# Patient Record
Sex: FEMALE | Race: WHITE | NOT HISPANIC OR LATINO | URBAN - METROPOLITAN AREA
[De-identification: names, ages, dates, MRNs, and addresses within clinical notes are randomized per-mention and may not be internally consistent; named-entity substitution may affect disease eponyms.]

---

## 2021-12-02 ENCOUNTER — OFFICE VISIT (OUTPATIENT)
Dept: URBAN - METROPOLITAN AREA CLINIC 60 | Facility: CLINIC | Age: 66
End: 2021-12-02

## 2021-12-16 ENCOUNTER — OFFICE VISIT (OUTPATIENT)
Dept: URBAN - METROPOLITAN AREA CLINIC 60 | Facility: CLINIC | Age: 66
End: 2021-12-16

## 2022-01-27 ENCOUNTER — OFFICE VISIT (OUTPATIENT)
Dept: URBAN - METROPOLITAN AREA SURGERY CENTER 4 | Facility: SURGERY CENTER | Age: 67
End: 2022-01-27

## 2022-02-10 ENCOUNTER — OFFICE VISIT (OUTPATIENT)
Dept: URBAN - METROPOLITAN AREA CLINIC 60 | Facility: CLINIC | Age: 67
End: 2022-02-10

## 2022-02-24 ENCOUNTER — OFFICE VISIT (OUTPATIENT)
Dept: URBAN - METROPOLITAN AREA SURGERY CENTER 4 | Facility: SURGERY CENTER | Age: 67
End: 2022-02-24

## 2022-02-28 LAB — PATHOLOGY (INDENTED REPORT): (no result)

## 2022-03-10 ENCOUNTER — OFFICE VISIT (OUTPATIENT)
Dept: URBAN - METROPOLITAN AREA CLINIC 60 | Facility: CLINIC | Age: 67
End: 2022-03-10

## 2022-07-09 ENCOUNTER — TELEPHONE ENCOUNTER (OUTPATIENT)
Dept: URBAN - METROPOLITAN AREA CLINIC 121 | Facility: CLINIC | Age: 67
End: 2022-07-09

## 2022-07-09 RX ORDER — LECITHIN 1200 MG
CAPSULE ORAL
Refills: 0 | OUTPATIENT
Start: 2009-04-21 | End: 2016-09-21

## 2022-07-09 RX ORDER — LEVALBUTEROL TARTRATE 45 UG/1
AEROSOL, METERED ORAL
Refills: 0 | OUTPATIENT
Start: 2021-10-21 | End: 2021-12-16

## 2022-07-09 RX ORDER — APIXABAN 5 MG/1
TABLET, FILM COATED ORAL ONCE A DAY
Refills: 0 | OUTPATIENT
Start: 2021-10-06 | End: 2021-12-16

## 2022-07-09 RX ORDER — LEVOCETIRIZINE DIHYDROCHLORIDE 5 MG/1
TABLET ORAL
Refills: 0 | OUTPATIENT
Start: 2021-10-30 | End: 2021-12-16

## 2022-07-09 RX ORDER — MONTELUKAST SODIUM 10 MG/1
TABLET, FILM COATED ORAL
Refills: 0 | OUTPATIENT
Start: 2019-11-06 | End: 2021-12-16

## 2022-07-09 RX ORDER — CITALOPRAM 20 MG/1
TABLET ORAL
Refills: 0 | OUTPATIENT
Start: 2021-11-26 | End: 2021-12-16

## 2022-07-09 RX ORDER — METOPROLOL SUCCINATE 50 MG/1
TABLET, EXTENDED RELEASE ORAL
Refills: 0 | OUTPATIENT
Start: 2011-06-06 | End: 2016-09-21

## 2022-07-09 RX ORDER — LEVOTHYROXINE SODIUM 100 MCG
TABLET ORAL
Refills: 0 | OUTPATIENT
Start: 2009-04-21 | End: 2016-09-21

## 2022-07-09 RX ORDER — LEVOTHYROXINE SODIUM 150 MCG
TABLET ORAL ONCE A DAY
Refills: 0 | OUTPATIENT
Start: 2016-09-21 | End: 2019-11-06

## 2022-07-09 RX ORDER — OMEPRAZOLE 20 MG/1
CAPSULE, DELAYED RELEASE ORAL
Refills: 0 | OUTPATIENT
Start: 2021-10-07 | End: 2021-12-16

## 2022-07-09 RX ORDER — CALCIUM CITRATE/VITAMIN D3 315MG-6.25
TABLET ORAL ONCE A DAY
Refills: 0 | OUTPATIENT
Start: 2016-09-21 | End: 2021-12-16

## 2022-07-09 RX ORDER — OMALIZUMAB 150 MG/ML
INJECTION, SOLUTION SUBCUTANEOUS
Refills: 0 | OUTPATIENT
Start: 2021-11-18 | End: 2021-12-16

## 2022-07-09 RX ORDER — CHLORHEXIDINE GLUCONATE 4 %
LIQUID (ML) TOPICAL
Refills: 0 | OUTPATIENT
Start: 2009-04-21 | End: 2016-09-21

## 2022-07-09 RX ORDER — METOPROLOL SUCCINATE 50 MG/1
TABLET, EXTENDED RELEASE ORAL AS NEEDED
Refills: 0 | OUTPATIENT
Start: 2016-09-21 | End: 2019-11-06

## 2022-07-09 RX ORDER — CITALOPRAM 20 MG/1
TABLET ORAL ONCE A DAY
Refills: 0 | OUTPATIENT
Start: 2016-09-19 | End: 2019-11-06

## 2022-07-09 RX ORDER — MULTIVITAMIN
TABLET ORAL
Refills: 0 | OUTPATIENT
Start: 2009-04-21 | End: 2016-09-21

## 2022-07-09 RX ORDER — CALCIUM CITRATE/VITAMIN D3 315MG-6.25
TABLET ORAL
Refills: 0 | OUTPATIENT
Start: 2011-06-06 | End: 2016-09-21

## 2022-07-10 ENCOUNTER — TELEPHONE ENCOUNTER (OUTPATIENT)
Dept: URBAN - METROPOLITAN AREA CLINIC 121 | Facility: CLINIC | Age: 67
End: 2022-07-10

## 2022-07-10 RX ORDER — LECITHIN 1200 MG
CAPSULE ORAL ONCE A DAY
Refills: 0 | Status: ACTIVE | COMMUNITY
Start: 2016-09-21

## 2022-07-10 RX ORDER — LEVOCETIRIZINE DIHYDROCHLORIDE 5 MG/1
TABLET ORAL
Refills: 0 | Status: ACTIVE | COMMUNITY
Start: 2019-10-09

## 2022-07-10 RX ORDER — OMALIZUMAB 150 MG/ML
INJECTION, SOLUTION SUBCUTANEOUS
Refills: 0 | Status: ACTIVE | COMMUNITY
Start: 2021-12-16

## 2022-07-10 RX ORDER — CITALOPRAM 20 MG/1
TABLET ORAL
Refills: 0 | Status: ACTIVE | COMMUNITY
Start: 2021-12-16

## 2022-07-10 RX ORDER — OMEPRAZOLE 20 MG/1
CAPSULE, DELAYED RELEASE ORAL ONCE A DAY
Refills: 3 | Status: ACTIVE | COMMUNITY
Start: 2011-06-06

## 2022-07-10 RX ORDER — CYCLOSPORINE 100 MG/1
CAPSULE, GELATIN COATED ORAL
Refills: 0 | Status: ACTIVE | COMMUNITY
Start: 2019-11-06

## 2022-07-10 RX ORDER — GINGER ROOT/GINGER ROOT EXT 262.5 MG
TID 400MG IN AM 600 THROUGHOUT THE DAY CAPSULE ORAL
Refills: 0 | Status: ACTIVE | COMMUNITY
Start: 2009-04-21

## 2022-07-10 RX ORDER — APIXABAN 5 MG/1
TABLET, FILM COATED ORAL ONCE A DAY
Refills: 0 | Status: ACTIVE | COMMUNITY
Start: 2021-12-16

## 2022-07-10 RX ORDER — OMALIZUMAB 202.5 MG/1.4ML
INJECTION, SOLUTION SUBCUTANEOUS
Refills: 0 | Status: ACTIVE | COMMUNITY
Start: 2019-11-25

## 2022-07-10 RX ORDER — LEVOCETIRIZINE DIHYDROCHLORIDE 5 MG/1
TABLET ORAL
Refills: 0 | Status: ACTIVE | COMMUNITY
Start: 2021-12-16

## 2022-07-10 RX ORDER — ALPRAZOLAM 0.25 MG
TABLET ORAL
Refills: 0 | Status: ACTIVE | COMMUNITY
Start: 2019-11-06

## 2022-07-10 RX ORDER — OMEPRAZOLE 20 MG/1
CAPSULE, DELAYED RELEASE ORAL
Refills: 0 | Status: ACTIVE | COMMUNITY
Start: 2021-12-16

## 2022-07-10 RX ORDER — LEVOTHYROXINE SODIUM 125 MCG
TABLET ORAL
Refills: 0 | Status: ACTIVE | COMMUNITY
Start: 2019-11-06

## 2022-07-10 RX ORDER — LEVALBUTEROL TARTRATE 45 UG/1
AEROSOL, METERED ORAL
Refills: 0 | Status: ACTIVE | COMMUNITY
Start: 2021-12-16

## 2022-07-10 RX ORDER — OMEPRAZOLE 20 MG/1
TABLET, DELAYED RELEASE ORAL
Refills: 0 | Status: ACTIVE | COMMUNITY
Start: 2019-11-06

## 2022-07-10 RX ORDER — MULTIVITAMIN
TABLET ORAL ONCE A DAY
Refills: 0 | Status: ACTIVE | COMMUNITY
Start: 2016-09-21

## 2022-07-10 RX ORDER — CITALOPRAM 20 MG/1
TABLET ORAL
Refills: 0 | Status: ACTIVE | COMMUNITY
Start: 2019-10-08

## 2022-07-10 RX ORDER — MONTELUKAST 10 MG/1
TABLET, FILM COATED ORAL
Refills: 0 | Status: ACTIVE | COMMUNITY
Start: 2019-06-14

## 2022-07-10 RX ORDER — CALCIUM CITRATE/VITAMIN D3 315MG-6.25
TABLET ORAL ONCE A DAY
Refills: 0 | Status: ACTIVE | COMMUNITY
Start: 2021-12-16

## 2022-07-10 RX ORDER — CHLORHEXIDINE GLUCONATE 4 %
LIQUID (ML) TOPICAL ONCE A DAY
Refills: 0 | Status: ACTIVE | COMMUNITY
Start: 2016-09-21

## 2025-01-22 ENCOUNTER — TELEPHONE ENCOUNTER (OUTPATIENT)
Dept: URBAN - METROPOLITAN AREA CLINIC 61 | Facility: CLINIC | Age: 70
End: 2025-01-22

## 2025-01-24 ENCOUNTER — DASHBOARD ENCOUNTERS (OUTPATIENT)
Age: 70
End: 2025-01-24

## 2025-01-24 ENCOUNTER — OFFICE VISIT (OUTPATIENT)
Dept: URBAN - METROPOLITAN AREA CLINIC 63 | Facility: CLINIC | Age: 70
End: 2025-01-24
Payer: MEDICARE

## 2025-01-24 VITALS
OXYGEN SATURATION: 96 % | HEART RATE: 89 BPM | SYSTOLIC BLOOD PRESSURE: 115 MMHG | WEIGHT: 168 LBS | TEMPERATURE: 98.7 F | HEIGHT: 64 IN | BODY MASS INDEX: 28.68 KG/M2 | DIASTOLIC BLOOD PRESSURE: 90 MMHG

## 2025-01-24 DIAGNOSIS — Z86.0100 PERSONAL HISTORY OF COLONIC POLYPS: ICD-10-CM

## 2025-01-24 DIAGNOSIS — R13.14 PHARYNGOESOPHAGEAL DYSPHAGIA: ICD-10-CM

## 2025-01-24 DIAGNOSIS — K57.32 SIGMOID DIVERTICULITIS: ICD-10-CM

## 2025-01-24 DIAGNOSIS — K21.9 GERD WITHOUT ESOPHAGITIS: ICD-10-CM

## 2025-01-24 DIAGNOSIS — I82.4Z9 DEEP VEIN THROMBOSIS (DVT) OF DISTAL VEIN OF LOWER EXTREMITY, UNSPECIFIED CHRONICITY, UNSPECIFIED LATERALITY: ICD-10-CM

## 2025-01-24 DIAGNOSIS — Z12.11 SCREEN FOR COLON CANCER: ICD-10-CM

## 2025-01-24 DIAGNOSIS — Q39.6 ESOPHAGEAL DIVERTICULUM: ICD-10-CM

## 2025-01-24 DIAGNOSIS — K22.0 ACHALASIA: ICD-10-CM

## 2025-01-24 PROBLEM — 404223003: Status: ACTIVE | Noted: 2025-01-24

## 2025-01-24 PROBLEM — 45564002: Status: ACTIVE | Noted: 2025-01-24

## 2025-01-24 PROBLEM — 428283002: Status: ACTIVE | Noted: 2025-01-24

## 2025-01-24 PROBLEM — 305058001: Status: ACTIVE | Noted: 2025-01-24

## 2025-01-24 PROBLEM — 266435005: Status: ACTIVE | Noted: 2025-01-24

## 2025-01-24 PROBLEM — 414133009: Status: ACTIVE | Noted: 2025-01-24

## 2025-01-24 PROBLEM — 40739000: Status: ACTIVE | Noted: 2025-01-24

## 2025-01-24 PROBLEM — 427910000: Status: ACTIVE | Noted: 2025-01-24

## 2025-01-24 PROCEDURE — 99204 OFFICE O/P NEW MOD 45 MIN: CPT | Performed by: INTERNAL MEDICINE

## 2025-01-24 RX ORDER — LEVALBUTEROL TARTRATE 45 UG/1
AEROSOL, METERED ORAL
Refills: 0 | Status: ACTIVE | COMMUNITY
Start: 2021-12-16

## 2025-01-24 RX ORDER — OMALIZUMAB 150 MG/ML
INJECTION, SOLUTION SUBCUTANEOUS
Refills: 0 | Status: ACTIVE | COMMUNITY
Start: 2021-12-16

## 2025-01-24 RX ORDER — ALPRAZOLAM 0.25 MG
TABLET ORAL
Refills: 0 | Status: ACTIVE | COMMUNITY
Start: 2019-11-06

## 2025-01-24 RX ORDER — GINGER ROOT/GINGER ROOT EXT 262.5 MG
TID 400MG IN AM 600 THROUGHOUT THE DAY CAPSULE ORAL
Refills: 0 | Status: ACTIVE | COMMUNITY
Start: 2009-04-21

## 2025-01-24 RX ORDER — CIPROFLOXACIN 500 MG/1
TAKE 1 TABLET BY MOUTH EVERY 12 HOURS FOR 10 DAYS TABLET ORAL
Qty: 20 EACH | Refills: 0 | Status: ACTIVE | COMMUNITY

## 2025-01-24 RX ORDER — LEVOCETIRIZINE DIHYDROCHLORIDE 5 MG/1
TABLET ORAL
Refills: 0 | Status: ACTIVE | COMMUNITY
Start: 2019-10-09

## 2025-01-24 RX ORDER — APIXABAN 5 MG/1
TABLET, FILM COATED ORAL ONCE A DAY
Refills: 0 | Status: ACTIVE | COMMUNITY
Start: 2021-12-16

## 2025-01-24 RX ORDER — CALCIUM CITRATE/VITAMIN D3 315MG-6.25
TABLET ORAL ONCE A DAY
Refills: 0 | Status: ACTIVE | COMMUNITY
Start: 2021-12-16

## 2025-01-24 RX ORDER — OMALIZUMAB 202.5 MG/1.4ML
INJECTION, SOLUTION SUBCUTANEOUS
Refills: 0 | Status: ACTIVE | COMMUNITY
Start: 2019-11-25

## 2025-01-24 RX ORDER — DOXYCYCLINE 100 MG/1
CAPSULE ORAL
Qty: 14 CAPSULE | Status: ACTIVE | COMMUNITY

## 2025-01-24 RX ORDER — CYCLOSPORINE 100 MG/1
CAPSULE, GELATIN COATED ORAL
Refills: 0 | Status: ACTIVE | COMMUNITY
Start: 2019-11-06

## 2025-01-24 RX ORDER — OMEPRAZOLE 20 MG/1
CAPSULE, DELAYED RELEASE ORAL ONCE A DAY
Refills: 3 | Status: ACTIVE | COMMUNITY
Start: 2011-06-06

## 2025-01-24 RX ORDER — OMEPRAZOLE 20 MG/1
TABLET, DELAYED RELEASE ORAL
Refills: 0 | Status: ACTIVE | COMMUNITY
Start: 2019-11-06

## 2025-01-24 RX ORDER — CHLORHEXIDINE GLUCONATE 4 %
LIQUID (ML) TOPICAL ONCE A DAY
Refills: 0 | Status: ACTIVE | COMMUNITY
Start: 2016-09-21

## 2025-01-24 RX ORDER — CITALOPRAM 20 MG/1
TABLET ORAL
Refills: 0 | Status: ACTIVE | COMMUNITY
Start: 2019-10-08

## 2025-01-24 RX ORDER — FAMOTIDINE 20 MG/1
TABLET, FILM COATED ORAL
Qty: 180 TABLET | Status: ACTIVE | COMMUNITY

## 2025-01-24 RX ORDER — AMOXICILLIN AND CLAVULANATE POTASSIUM 500; 125 MG/1; MG/1
1 TABLET TABLET, FILM COATED ORAL
Qty: 14 | OUTPATIENT
Start: 2025-01-24 | End: 2025-01-31

## 2025-01-24 RX ORDER — MONTELUKAST 10 MG/1
TABLET, FILM COATED ORAL
Refills: 0 | Status: ACTIVE | COMMUNITY
Start: 2019-06-14

## 2025-01-24 RX ORDER — LECITHIN 1200 MG
CAPSULE ORAL ONCE A DAY
Refills: 0 | Status: ACTIVE | COMMUNITY
Start: 2016-09-21

## 2025-01-24 RX ORDER — OMALIZUMAB 150 MG/ML
INJECTION, SOLUTION SUBCUTANEOUS
Qty: 2 MILLILITER | Refills: 0 | Status: ACTIVE | COMMUNITY

## 2025-01-24 RX ORDER — MULTIVITAMIN
TABLET ORAL ONCE A DAY
Refills: 0 | Status: ACTIVE | COMMUNITY
Start: 2016-09-21

## 2025-01-24 RX ORDER — LEVOTHYROXINE SODIUM 125 MCG
TABLET ORAL
Refills: 0 | Status: ACTIVE | COMMUNITY
Start: 2019-11-06

## 2025-01-24 NOTE — HPI-PREVIOUS PROCEDURES
Upper endoscopy Dr. Holly 2022: Dysphagia  Middle third of the esophagus diverticulum, dilatation with a 54 Ken[place] Menendez normal stomach, normal duodenum.  Duodenal biopsies unremarkable.  Upper endoscopy 2019 Dr. Holly: Normal esophagus-biopsies negative for EOE and intestinal metaplasia, mid esophageal diverticulum.  Dilation carried out with a 54 Kenyan savory normal stomach normal duodenal  Colonoscopy 2016 Dr. Holly:  Good bowel prep, procedure performed without difficulty, moderate diverticulosis noted, grade 1 hemorrhoids, no polyps noted and no specimens collected.  Upper endoscopy 2016 Dr. Holly: Esophageal diverticulum in the lower third, esophageal dilation with 54 Kenyan savory.  Normal stomach, multiple gastric polyps-fundic gland polyps on pathology, H. pylori negative gastritis normal duodenum  Colonoscopy 2011   EGD/colonoscopy Dr. Holly 2004:  Esophageal diverticulum, normal GE junction, mild H. pylori negative gastritis, mild sigmoid erythema with unremarkable biopsies, no evidence of diverticulosis, hemorrhoids.  Otherwise normal colonoscopy to the cecum with good colon prep

## 2025-01-24 NOTE — HPI-PREVIOUS IMAGING
Negative aspiration study in 2022  Barium swallow 2020:  Mild esophageal dysmotility, prominent diverticulum of the lower esophagus-likely traction diverticulum, no focal stricture, barium tablet cytology briefly at the GE junction but passes with additional sips of barium.  Mild hiatal hernia  CT scan abdomen pelvis with contrast for lower abdominal pain 2019: Innumerable liver cysts, largest cyst 4.1 cm in the right hepatic lobe, no suspicious masses, liver size and density is normal, normal pancreas spleen.  Numerous renal cysts, abundant stool throughout the colon, colonic diverticulosis, no diverticulitis, small bowel appear normal,  Transvaginal sonogram 2004-multiple fibroids of the uterus

## 2025-01-24 NOTE — HPI-TODAY'S VISIT:
Santa is a pleasant 69-year-old female who was previously seen by Dr. Holly and has undergone multiple colonoscopies in endoscopies with esophageal dilatati on is here today to establish her care with me.  She was frustrated with a lot of things happening recently over the past few days including eyelid surgery, treatments for frozen shoulder, cracked her tooth, and now diagnosed sigmoid diverticulitis and the abnormal CAT scan showing fluid in esophagus.  Left lower quadrant pain started 3-4 days ago and a CAT scan was ordered that noted simple uncomplicated sigmoid diverticulitis and also incidentally was noted to have a fluid-filled esophagus.  She then tells me that she has a history of achalasia and was evaluated at [facility] by Dr Hanson and underwent esophageal POEM.  Since then her symptoms of dysphagia have resolved. She denies any symptoms of dysphagia or odynophagia or any symptoms of regurgitation.  She denies any unintentional weight loss or loss of appetite.  She reports regular bowel movements and denies any rectal bleeding or melena.  Continues to have left lower quadrant pain but denies any fevers or chills or any bleeding.  She is currently on a regular diet but taking Cipro and doxycycline.  Refer below for her endoscopies and colonoscopy findings. Apparently during one of her endoscopies she suffered respiratory complications and Dr. Holly felt that future procedure should be done in the hospital setting. Given her history of achalasia and the need to do her procedures in the hospital setting I will refer her to .  She completely agreed with this referral.  She is on Eliquis for DVT and apparently was told that she had a remote history of an MI.- negative stress test in 2023

## 2025-01-27 ENCOUNTER — TELEPHONE ENCOUNTER (OUTPATIENT)
Dept: URBAN - METROPOLITAN AREA CLINIC 63 | Facility: CLINIC | Age: 70
End: 2025-01-27

## 2025-02-12 ENCOUNTER — LAB OUTSIDE AN ENCOUNTER (OUTPATIENT)
Dept: URBAN - METROPOLITAN AREA CLINIC 63 | Facility: CLINIC | Age: 70
End: 2025-02-12

## 2025-02-12 ENCOUNTER — OFFICE VISIT (OUTPATIENT)
Dept: URBAN - METROPOLITAN AREA CLINIC 63 | Facility: CLINIC | Age: 70
End: 2025-02-12
Payer: MEDICARE

## 2025-02-12 VITALS
DIASTOLIC BLOOD PRESSURE: 84 MMHG | WEIGHT: 169 LBS | HEART RATE: 81 BPM | BODY MASS INDEX: 28.85 KG/M2 | OXYGEN SATURATION: 95 % | SYSTOLIC BLOOD PRESSURE: 118 MMHG | TEMPERATURE: 97.9 F | HEIGHT: 64 IN

## 2025-02-12 DIAGNOSIS — I82.4Z9 DEEP VEIN THROMBOSIS (DVT) OF DISTAL VEIN OF LOWER EXTREMITY, UNSPECIFIED CHRONICITY, UNSPECIFIED LATERALITY: ICD-10-CM

## 2025-02-12 DIAGNOSIS — K57.32 SIGMOID DIVERTICULITIS: ICD-10-CM

## 2025-02-12 DIAGNOSIS — K21.9 GERD WITHOUT ESOPHAGITIS: ICD-10-CM

## 2025-02-12 DIAGNOSIS — K22.0 ACHALASIA: ICD-10-CM

## 2025-02-12 DIAGNOSIS — Q39.6 ESOPHAGEAL DIVERTICULUM: ICD-10-CM

## 2025-02-12 DIAGNOSIS — Z86.0100 PERSONAL HISTORY OF COLONIC POLYPS: ICD-10-CM

## 2025-02-12 PROCEDURE — 99214 OFFICE O/P EST MOD 30 MIN: CPT | Performed by: PHYSICIAN ASSISTANT

## 2025-02-12 RX ORDER — ST. JOHN'S WORT 300 MG
1 CAPSULE CAPSULE ORAL ONCE A DAY
Status: ACTIVE | COMMUNITY

## 2025-02-12 RX ORDER — CIPROFLOXACIN 500 MG/1
1 TABLET TABLET, FILM COATED ORAL
Status: ACTIVE | COMMUNITY

## 2025-02-12 RX ORDER — LEVOTHYROXINE SODIUM 112 UG/1
TAKE 1 TABLET DAILY TABLET ORAL
Qty: 90 EACH | Refills: 0 | Status: ACTIVE | COMMUNITY

## 2025-02-12 RX ORDER — FAMOTIDINE 20 MG/1
TABLET, FILM COATED ORAL
Qty: 180 TABLET | Status: ACTIVE | COMMUNITY

## 2025-02-12 RX ORDER — DOXEPIN HYDROCHLORIDE 10 MG/1
1 CAPSULE AT BEDTIME CAPSULE ORAL ONCE A DAY
Status: ACTIVE | COMMUNITY

## 2025-02-12 RX ORDER — OMEPRAZOLE 20 MG/1
CAPSULE, DELAYED RELEASE ORAL
Qty: 90 CAPSULE | Status: ACTIVE | COMMUNITY

## 2025-02-12 RX ORDER — MONTELUKAST 10 MG/1
TABLET, FILM COATED ORAL
Refills: 0 | Status: ACTIVE | COMMUNITY
Start: 2019-06-14

## 2025-02-12 RX ORDER — ROSUVASTATIN 10 MG/1
TABLET, FILM COATED ORAL
Qty: 90 TABLET | Status: ACTIVE | COMMUNITY

## 2025-02-12 RX ORDER — GABAPENTIN 300 MG/1
TAKE 1 CAPSULE BY MOUTH EVERY DAY CAPSULE ORAL
Qty: 90 EACH | Refills: 0 | Status: ACTIVE | COMMUNITY

## 2025-02-12 RX ORDER — GINGER ROOT/GINGER ROOT EXT 262.5 MG
TID 400MG IN AM 600 THROUGHOUT THE DAY CAPSULE ORAL
Refills: 0 | Status: ACTIVE | COMMUNITY
Start: 2009-04-21

## 2025-02-12 RX ORDER — OMALIZUMAB 150 MG/ML
INJECTION, SOLUTION SUBCUTANEOUS
Qty: 2 MILLILITER | Refills: 0 | Status: ACTIVE | COMMUNITY

## 2025-02-12 RX ORDER — MULTIVITAMIN
TABLET ORAL ONCE A DAY
Refills: 0 | Status: ACTIVE | COMMUNITY
Start: 2016-09-21

## 2025-02-12 RX ORDER — DOXYCYCLINE 100 MG/1
CAPSULE ORAL
Qty: 14 CAPSULE | Status: ACTIVE | COMMUNITY

## 2025-02-12 RX ORDER — CALCIUM CITRATE/VITAMIN D3 315MG-6.25
TABLET ORAL ONCE A DAY
Refills: 0 | Status: ACTIVE | COMMUNITY
Start: 2021-12-16

## 2025-02-12 RX ORDER — APIXABAN 5 MG/1
TABLET, FILM COATED ORAL ONCE A DAY
Refills: 0 | Status: ACTIVE | COMMUNITY
Start: 2021-12-16

## 2025-02-12 RX ORDER — TRAMADOL HYDROCHLORIDE 50 MG/1
TAKE 1 TABLET BY MOUTH TWICE DAILY AS NEEDED TABLET, COATED ORAL
Qty: 166 EACH | Refills: 1 | Status: ACTIVE | COMMUNITY

## 2025-02-12 RX ORDER — LEVALBUTEROL TARTRATE 45 UG/1
AEROSOL, METERED ORAL
Refills: 0 | Status: ACTIVE | COMMUNITY
Start: 2021-12-16

## 2025-02-12 NOTE — HPI-TODAY'S VISIT:
69-year-old female, patient of Dr. Guillen, with asthma, DVT on Eliquis, CAD with remote history of MI, hyperlipidemia, SVT, bradycardia, hypothyroidism, GYN cancer, achalasia, diverticulitis presents to the office for follow-up.  She was last seen in the office by Dr. Guillen on January 24, 2025.  At the time of her last office visit, she had left lower quadrant abdominal pain which began about 3 to 4 days prior.  A CAT scan was done which noted simple uncomplicated sigmoid diverticulitis and also incidentally noted a fluid-filled esophagus.  She reported a history of achalasia and was evaluated at [facility] by Dr. Hansen and underwent E-POEM. Her dysphagia resolved after myotomy.  She denied any constipation, diarrhea, fever, chills.  She denied any rectal bleeding.  She was prescribed a 7-day course of Augmentin.  She was referred to Dr. Villeda, [facility] for colonoscopy to rule out underlying malignancy.  She had apparently stopped breathing she had apparently stopped breathing at our [facility] on prior endoscopy and was told that future procedure should be done at the hospital.  EGD was also recommended in the hospital setting.  She presents back to the office today with worsening LLQ pain after she completed her course of Augmentin. When her pain returned, she started taking the additional 5 days of cipro and doxycyline that she had left over from her visit with her PCP. She has 3 days left to complete. LLQ pain rated 4-5/10. Pain is constant. She had a lot of diarrhea, up to 15 times a day when she was taking Augmentin. Her diarrhea seemed to calm down when she completed Augmentin but she is still having diarrhea occasional but has been using imodium after each episode. She has had 4 episodes of diarrhea since she finished her Augementin 2 weeks ago but has otherwise been having formed stools. Denies any blood in the stool. Denies any fevers. She is scheduled to see [facility] in March 2025.

## 2025-02-14 ENCOUNTER — LAB OUTSIDE AN ENCOUNTER (OUTPATIENT)
Dept: URBAN - METROPOLITAN AREA CLINIC 63 | Facility: CLINIC | Age: 70
End: 2025-02-14

## 2025-02-15 LAB — CLOSTRIDIUM DIFFICILE: (no result)

## 2025-02-27 ENCOUNTER — OFFICE VISIT (OUTPATIENT)
Dept: URBAN - METROPOLITAN AREA CLINIC 63 | Facility: CLINIC | Age: 70
End: 2025-02-27

## 2025-02-27 RX ORDER — DOXEPIN HYDROCHLORIDE 10 MG/1
1 CAPSULE AT BEDTIME CAPSULE ORAL ONCE A DAY
COMMUNITY

## 2025-02-27 RX ORDER — TRAMADOL HYDROCHLORIDE 50 MG/1
TAKE 1 TABLET BY MOUTH TWICE DAILY AS NEEDED TABLET, COATED ORAL
Qty: 166 EACH | Refills: 1 | COMMUNITY

## 2025-02-27 RX ORDER — MONTELUKAST 10 MG/1
TABLET, FILM COATED ORAL
Refills: 0 | COMMUNITY
Start: 2019-06-14

## 2025-02-27 RX ORDER — LEVALBUTEROL TARTRATE 45 UG/1
AEROSOL, METERED ORAL
Refills: 0 | COMMUNITY
Start: 2021-12-16

## 2025-02-27 RX ORDER — LEVOTHYROXINE SODIUM 112 UG/1
TAKE 1 TABLET DAILY TABLET ORAL
Qty: 90 EACH | Refills: 0 | COMMUNITY

## 2025-02-27 RX ORDER — MULTIVITAMIN
TABLET ORAL ONCE A DAY
Refills: 0 | COMMUNITY
Start: 2016-09-21

## 2025-02-27 RX ORDER — CALCIUM CITRATE/VITAMIN D3 315MG-6.25
TABLET ORAL ONCE A DAY
Refills: 0 | COMMUNITY
Start: 2021-12-16

## 2025-02-27 RX ORDER — GINGER ROOT/GINGER ROOT EXT 262.5 MG
TID 400MG IN AM 600 THROUGHOUT THE DAY CAPSULE ORAL
Refills: 0 | COMMUNITY
Start: 2009-04-21

## 2025-02-27 RX ORDER — OMALIZUMAB 150 MG/ML
INJECTION, SOLUTION SUBCUTANEOUS
Qty: 2 MILLILITER | Refills: 0 | COMMUNITY

## 2025-02-27 RX ORDER — APIXABAN 5 MG/1
TABLET, FILM COATED ORAL ONCE A DAY
Refills: 0 | COMMUNITY
Start: 2021-12-16

## 2025-02-27 RX ORDER — GABAPENTIN 300 MG/1
TAKE 1 CAPSULE BY MOUTH EVERY DAY CAPSULE ORAL
Qty: 90 EACH | Refills: 0 | COMMUNITY

## 2025-02-27 RX ORDER — FAMOTIDINE 20 MG/1
TABLET, FILM COATED ORAL
Qty: 180 TABLET | COMMUNITY

## 2025-02-27 RX ORDER — ST. JOHN'S WORT 300 MG
1 CAPSULE CAPSULE ORAL ONCE A DAY
COMMUNITY

## 2025-02-27 RX ORDER — ROSUVASTATIN 10 MG/1
TABLET, FILM COATED ORAL
Qty: 90 TABLET | COMMUNITY

## 2025-02-27 RX ORDER — DOXYCYCLINE 100 MG/1
CAPSULE ORAL
Qty: 14 CAPSULE | COMMUNITY

## 2025-02-27 RX ORDER — OMEPRAZOLE 20 MG/1
CAPSULE, DELAYED RELEASE ORAL
Qty: 90 CAPSULE | COMMUNITY

## 2025-02-27 NOTE — HPI-PREVIOUS PROCEDURES
Upper endoscopy Dr. Holly 2022: Dysphagia  Middle third of the esophagus diverticulum, dilatation with a 54 [place]an Menendez normal stomach, normal duodenum.  Duodenal biopsies unremarkable.  Upper endoscopy 2019 Dr. Holly: Normal esophagus-biopsies negative for EOE and intestinal metaplasia, mid esophageal diverticulum.  Dilation carried out with a 54 Singaporean savory normal stomach normal duodenal  Colonoscopy 2016 Dr. Holly:  Good bowel prep, procedure performed without difficulty, moderate diverticulosis noted, grade 1 hemorrhoids, no polyps noted and no specimens collected.  Upper endoscopy 2016 Dr. Holly: Esophageal diverticulum in the lower third, esophageal dilation with 54 Singaporean savory.  Normal stomach, multiple gastric polyps-fundic gland polyps on pathology, H. pylori negative gastritis normal duodenum  Colonoscopy 2011   EGD/colonoscopy Dr. Holly 2004:  Esophageal diverticulum, normal GE junction, mild H. pylori negative gastritis, mild sigmoid erythema with unremarkable biopsies, no evidence of diverticulosis, hemorrhoids.  Otherwise normal colonoscopy to the cecum with good colon prep

## 2025-02-27 NOTE — HPI-TODAY'S VISIT:
69-year-old female, patient of Dr. Guillen with asthma, DVT on Eliquis, CAD with remote history of MI, hyperlipidemia, SVT, bradycardia, hypothyroidism, GYN cancer, achalasia, diverticulitis presents for follow-up. She was last seen in the office on February 12, 2025.  She was being followed with left lower quadrant pain at the time which had been going on since mid January 2025.  She had a CT scan of the abdomen and pelvis with contrast on January 20, 2025 which noted simple uncomplicated sigmoid diverticulitis and incidentally noted a fluid-filled esophagus.  She was initially started on treatment with Cipro and doxycycline by her PCP.  She was seen in our office by Dr. Guillen on January 24, 2025 and was prescribed a 7-day course of Augmentin.  She was referred to [facility] for colonoscopy to rule out an underlying malignancy.  She had apparently stopped breathing at our [facility] on a prior endoscopy and was told that future procedure should be done at the hospital.  EGD was also recommended in the hospital setting. She presented back to the office on February 12, 2025 with worsening left lower quadrant pain.  By that time, she had completed her Augmentin and she restarted taking the additional 5 days of Cipro and doxycycline that she had leftover from her visit with her PCP.  She had been on the antibiotics for 2 days.  She reported constant 4-5/10 left lower quadrant pain.  She had a lot of diarrhea while she was taking Augmentin but that had resolved.  She denied any fevers or blood in the stool.  She had an appointment set up with [facility] in March 2025. She was sent for a repeat CT scan of the abdomen and pelvis with contrast which was done on February 14, 2025.  Her CT demonstrated resolving acute diverticulitis of the sigmoid colon.  The previously noted inflammatory changes of acute diverticulitis had markedly improved.  No abscess or fluid collection.  Findings of hepatic steatosis.  She was advised to finish up the last few days of Cipro and doxycycline. She presents back to the office today

## 2025-03-14 ENCOUNTER — P2P PATIENT RECORD (OUTPATIENT)
Age: 70
End: 2025-03-14